# Patient Record
Sex: MALE | Race: WHITE | NOT HISPANIC OR LATINO | Employment: FULL TIME | URBAN - METROPOLITAN AREA
[De-identification: names, ages, dates, MRNs, and addresses within clinical notes are randomized per-mention and may not be internally consistent; named-entity substitution may affect disease eponyms.]

---

## 2018-03-19 DIAGNOSIS — Z00.00 ROUTINE GENERAL MEDICAL EXAMINATION AT A HEALTH CARE FACILITY: Primary | ICD-10-CM

## 2018-06-08 ENCOUNTER — TELEPHONE (OUTPATIENT)
Dept: INTERNAL MEDICINE | Facility: CLINIC | Age: 38
End: 2018-06-08

## 2018-06-08 ENCOUNTER — HOSPITAL ENCOUNTER (OUTPATIENT)
Dept: CARDIOLOGY | Facility: CLINIC | Age: 38
Discharge: HOME | End: 2018-06-08
Payer: COMMERCIAL

## 2018-06-08 ENCOUNTER — OFFICE VISIT (OUTPATIENT)
Dept: INTERNAL MEDICINE | Facility: CLINIC | Age: 38
End: 2018-06-08
Payer: COMMERCIAL

## 2018-06-08 ENCOUNTER — CLINICAL SUPPORT (OUTPATIENT)
Dept: INTERNAL MEDICINE | Facility: CLINIC | Age: 38
End: 2018-06-08
Payer: COMMERCIAL

## 2018-06-08 ENCOUNTER — HOSPITAL ENCOUNTER (OUTPATIENT)
Dept: RADIOLOGY | Facility: HOSPITAL | Age: 38
Discharge: HOME | End: 2018-06-08
Attending: INTERNAL MEDICINE
Payer: COMMERCIAL

## 2018-06-08 VITALS
HEART RATE: 70 BPM | SYSTOLIC BLOOD PRESSURE: 120 MMHG | HEIGHT: 71 IN | BODY MASS INDEX: 27.16 KG/M2 | OXYGEN SATURATION: 97 % | WEIGHT: 194 LBS | DIASTOLIC BLOOD PRESSURE: 82 MMHG

## 2018-06-08 DIAGNOSIS — Z00.00 ROUTINE GENERAL MEDICAL EXAMINATION AT A HEALTH CARE FACILITY: Primary | ICD-10-CM

## 2018-06-08 DIAGNOSIS — Z00.00 HEALTH CARE MAINTENANCE: Primary | ICD-10-CM

## 2018-06-08 DIAGNOSIS — D69.6 THROMBOCYTOPENIA: ICD-10-CM

## 2018-06-08 DIAGNOSIS — Z00.00 ROUTINE GENERAL MEDICAL EXAMINATION AT A HEALTH CARE FACILITY: ICD-10-CM

## 2018-06-08 DIAGNOSIS — R17 ELEVATED BILIRUBIN: ICD-10-CM

## 2018-06-08 LAB
ALBUMIN SERPL BCP-MCNC: 4.1 G/DL
ALP SERPL-CCNC: 78 U/L
ALT SERPL W/O P-5'-P-CCNC: 23 U/L
ANION GAP SERPL CALC-SCNC: 6 MMOL/L
AST SERPL-CCNC: 21 U/L
BILIRUB SERPL-MCNC: 1.9 MG/DL
BUN SERPL-MCNC: 15 MG/DL
CALCIUM SERPL-MCNC: 9.7 MG/DL
CHLORIDE SERPL-SCNC: 106 MMOL/L
CHOLEST SERPL-MCNC: 170 MG/DL
CHOLEST/HDLC SERPL: 3.2 {RATIO}
CO2 SERPL-SCNC: 27 MMOL/L
CREAT SERPL-MCNC: 1.2 MG/DL
ERYTHROCYTE [DISTWIDTH] IN BLOOD BY AUTOMATED COUNT: 12.7 %
EST. GFR  (AFRICAN AMERICAN): >60 ML/MIN/1.73 M^2
EST. GFR  (NON AFRICAN AMERICAN): >60 ML/MIN/1.73 M^2
ESTIMATED AVG GLUCOSE: 88 MG/DL
GLUCOSE SERPL-MCNC: 93 MG/DL
HBA1C MFR BLD HPLC: 4.7 %
HCT VFR BLD AUTO: 45.4 %
HDLC SERPL-MCNC: 53 MG/DL
HDLC SERPL: 31.2 %
HGB BLD-MCNC: 16 G/DL
HIV 1+2 AB+HIV1 P24 AG SERPL QL IA: NEGATIVE
LDLC SERPL CALC-MCNC: 104 MG/DL
MCH RBC QN AUTO: 30.8 PG
MCHC RBC AUTO-ENTMCNC: 35.2 G/DL
MCV RBC AUTO: 88 FL
NONHDLC SERPL-MCNC: 117 MG/DL
PLATELET # BLD AUTO: 138 K/UL
PMV BLD AUTO: 10.3 FL
POTASSIUM SERPL-SCNC: 4.5 MMOL/L
PROT SERPL-MCNC: 7.3 G/DL
RBC # BLD AUTO: 5.19 M/UL
SODIUM SERPL-SCNC: 139 MMOL/L
TRIGL SERPL-MCNC: 65 MG/DL
WBC # BLD AUTO: 5.86 K/UL

## 2018-06-08 PROCEDURE — 83036 HEMOGLOBIN GLYCOSYLATED A1C: CPT

## 2018-06-08 PROCEDURE — 86703 HIV-1/HIV-2 1 RESULT ANTBDY: CPT

## 2018-06-08 PROCEDURE — 99385 PREV VISIT NEW AGE 18-39: CPT | Mod: S$GLB,,, | Performed by: INTERNAL MEDICINE

## 2018-06-08 PROCEDURE — 97750 PHYSICAL PERFORMANCE TEST: CPT | Mod: S$GLB,,, | Performed by: INTERNAL MEDICINE

## 2018-06-08 PROCEDURE — 99999 PR PBB SHADOW E&M-EST. PATIENT-LVL III: CPT | Mod: PBBFAC,,, | Performed by: INTERNAL MEDICINE

## 2018-06-08 PROCEDURE — 93000 ELECTROCARDIOGRAM COMPLETE: CPT | Mod: S$GLB,,, | Performed by: INTERNAL MEDICINE

## 2018-06-08 PROCEDURE — 97802 MEDICAL NUTRITION INDIV IN: CPT | Mod: S$GLB,,, | Performed by: INTERNAL MEDICINE

## 2018-06-08 PROCEDURE — 71046 X-RAY EXAM CHEST 2 VIEWS: CPT | Mod: TC,FY

## 2018-06-08 PROCEDURE — 71046 X-RAY EXAM CHEST 2 VIEWS: CPT | Mod: 26,,, | Performed by: RADIOLOGY

## 2018-06-08 PROCEDURE — 80053 COMPREHEN METABOLIC PANEL: CPT

## 2018-06-08 PROCEDURE — 85027 COMPLETE CBC AUTOMATED: CPT

## 2018-06-08 PROCEDURE — 80061 LIPID PANEL: CPT

## 2018-06-08 PROCEDURE — 36415 COLL VENOUS BLD VENIPUNCTURE: CPT

## 2018-06-08 NOTE — PROGRESS NOTES
"Nutrition Assessment  Client name:  Matty Engel  :  1980  Age:  37 y.o.  Gender:  male    Client states:  Very pleasant [employer] employee here for his initial Executive Health physical.  Prefers to be called by his middle name, "Kristyn."  Has a somewhat unremarkable PMH with the exception of lung sx (in high school) and general "aches and pains" associated with aging.  Does not take any prescription medications or supplements daily.  Maintains a fairly active lifestyle, playing tennis 1x/week in addition to jogging 2-3x/week.  Admits, however, that when he suffers generalized pain from exercising, he stops exercising for weeks at a time and notices his eating habits worsen as a result.  Adds that he is a "stress eater," indulging in [employer] ice cream, cookies, or candy when stressed.  Typically, limits intake to 1x/week in pint-size form yet admits that he currently has a half gallon at home.  Realizes the importance of portion control and self-awareness.  Has made improvements in his dietary behaviors over the past ten years, noting that he has increased his water intake and reduced his intake of soft drinks (Dr. Pepper specifically) and candy.  However, admits that he and his wife dine out frequently, noting that they prepare meals at home 2x/week, dine out 1-2x/week, and order Uber[employer]ats 2x/week.  His wife follows a vegetarian meal plan and so, limits intake of meat.  Will eat meat, however, when dining out.  Adds that he considers his fruit and vegetable intake to be somewhat inadequate as he does not like all vegetables and does not make a conscious effort to eat fruit daily.  Has not had a "true" physical in several years as he has only completed onsite screenings at work when available.  Recalls slightly elevated BP at most recent screening.  Overall, considers his eating habits to be "mediocre," stating that they are not the worst but also not the best either.  Has never paid attention to nutrition " "labels specifically, inquiring about caloric and macronutrient targets of concern.  Overall, expressed interest in nutrition education and readiness to adopt improved dietary behaviors.    No past medical history on file.    Social History    Marital status:    Employment:  [employer]  Social History   Substance Use Topics    Smoking status: Never Smoker    Smokeless tobacco: Not on file    Alcohol use Yes      Comment: socially        Current medications:  currently has no medications in their medication list.  Vitamins, minerals, and/or supplements:  None   Food allergies or intolerances:  NKFA     Food History  Breakfast:  PJ's granita + croissant  Mid-morning Snack:  None  Lunch:  (typically eaten out) Plantain bowl/chicken + rice/gumbo + water  Mid-afternoon Snack:  None  Dinner:  Vegetable spaghetti/tacos/chicken enchiladas  H.S. Snack:  +/- ice cream (1x/week)    Exercise History:  Tennis 1x/week + jogging + lower back exercises 2-3x/week    Lab Reports   Total Cholesterol:  170    Triglycerides:  65  HDL:  53  LDL:  104   Glucose:  93  HbA1c:  4.7%  BP:  120/82  Bilirubin:  1.9     Weight History  Height:  5' 11"     Weight:  193#  BMI:  26.9  % Body Fat:  18.65%    Diagnosis  RMR (Method:  Body Welling):  2280 kcal  Activity Factor:  1.4  ADIEL:  3192 - 250 = 2942 kcal    Food and nutrition-related knowledge deficit related to inadequate nutrition education as evidenced by patient verbalizes inadequate nutrition education re:  general health.    Intervention    Goals:  1.  Maintain healthy body weight  2.  Maintain > 150 minutes physical activity/week as tolerated  3.  Increase meals prepared at home to 4x/week, reducing dining out frequency  4.  Consider meal delivery services (examples provided)  5.  Incorporate 1 fruit daily with breakfast and/or snack  6.  Incorporate a half plate of non-starchy vegetables with lunch and dinner daily, particularly when dining out  7.  Complete annual " physicals    To note, labs were not ready at time of consult and so, not discussed with patient.  Will defer elevated bilirubin to MD.  Nontheless, praised patient for active lifestyle and health conscious behaviors.  Supported patient's desire for meal delivery services in an attempt to reduce dining out frequency.  Reviewed local meal delivery companies and emailed link to recommended companies as reference.  Discussed the potential health consequences of frequent dining out as well as the benefits of increased fruit and vegetable intake.  Advised patient to focus on incorporating 1 fruit daily in addition to a half plate of non-starchy vegetables with lunch and dinner, particularly when dining out, for added fiber, satiety, and reduction in overeating.  Also, answered patient's questions regarding BP, discussing factors affecting it and ways to improve via reduced sodium intake, adequate potassium intake, and weight management.  Answered additional questions regarding label reading, caloric value, and macronutrient targets.  Stressed the importance of completing physicals annually for general health maintenance and preventative purposes.  Patient expressed overall satisfaction and gratitude of EH program and nutrition education.    Handouts provided:  Meal Planning Guide  Restaurant Guide  Eat Fit Shopping List  Eat Fit [place]  Fast Food Guide  Vitamin/Mineral Guide    Monitoring/Evaluation    Monitor the following:  Weight  BMI  Caloric intake  Bilirubin    Follow Up Plan:  Follow up with client in 1-2 years

## 2018-06-08 NOTE — PROGRESS NOTES
Subjective:       Patient ID: Matty Engel is a 37 y.o. male.    Chief Complaint: No chief complaint on file.    HPI   Patient has reported no previous history of cardiovascular or pulmonary disease.     Physical Limitations:   - Lower back hurts at times for the past 5 years.     - Fine now    Current Exercise Routine:   - Tennis 2 days per week 1.5 hours singles and doubles   - Jog 30 minutes 2 days per week    - Lower back exercises and push ups before jogs    Patient friendly and familiar with exercise.     Review of Systems    Objective:      The fitness evaluation results are as follows:    D.O.S. 6/8/2018   Height (in): 71   Weight (lbs): 193   BMI: 26.2186886   Body Fat (%): 18.65   Waist (cm): 87   Hip (cm): 105   WHR: 0.83   RBP (mmHg): 120/80   RHR (bpm): 66    Strength R (lbs)t: 116.061130    Strength Lt (lbs): 122.934955   Push-up Assessment: 40   Curl-up Assessment: 75   Flexibility Testing (cm): 46   REE (kcals): 2280     Physical Exam    Assessment:      Age/Gender Stratified Assessment:    Resting BP: Within Testing Limits   Body Fat %: Very Good   WHR Risk Factor: Low Risk    Strength R: Average    Strength L: Average   Upper Body Endurance: Excellent   Abdominal Endurance: Well Above Average   Lower body Flexibility: Excellent      1. Routine general medical examination at a health care facility        Plan:    Patient should continue with regular exercise routine following below guidelines.     Recommended Fitness Guidelines:   - 150 minutes of moderate intensity aerobic exercise each week OR 75 minutes of vigorous    - 2-4 days per week of resistance training for each muscle group   - Daily stretching    Back Stabilization protocol given

## 2018-06-08 NOTE — PROGRESS NOTES
"Subjective:       Patient ID: Matty Engel is a 37 y.o. male.    Chief Complaint: Executive Health    HPI   Matty Engel is a 37 y.o. male here for an annual executive health physical.     a1c 4.7%  plt 138  Bili 1.9  10 year lipid risk 0.7%  CXR nl  EKG awaiting read - NSR    He has hx of recurring low back pain. Doing well last 6 months. MRI last year with herniated disc. Has done chiropractor and PT.     MGF had colon cancer. He has had colonoscopies - last 2 years ago.    Review of Systems   Constitutional: Negative for fever.   HENT: Negative.    Eyes: Negative.    Respiratory: Negative for shortness of breath.    Cardiovascular: Negative for chest pain and leg swelling.   Gastrointestinal: Negative for abdominal pain, diarrhea, nausea and vomiting.   Genitourinary: Negative.    Musculoskeletal: Negative for arthralgias.   Skin: Negative for rash.   Psychiatric/Behavioral: Negative.        Objective:   /82 (BP Location: Right arm, Patient Position: Sitting, BP Method: Large (Manual))   Pulse 70   Ht 5' 11" (1.803 m)   Wt 88 kg (194 lb)   SpO2 97%   BMI 27.06 kg/m²      Physical Exam   Constitutional: He is oriented to person, place, and time. He appears well-developed and well-nourished. No distress.   HENT:   Head: Normocephalic and atraumatic.   Cardiovascular: Normal rate and regular rhythm.    No murmur heard.  Pulmonary/Chest: Effort normal. No respiratory distress. He has no wheezes. He has no rales.   Neurological: He is alert and oriented to person, place, and time.   Skin: Skin is warm and dry. He is not diaphoretic.   Psychiatric: He has a normal mood and affect. His behavior is normal.       Assessment:       1. Health care maintenance    2. Elevated bilirubin    3. Thrombocytopenia        Plan:       Matty was seen today for [employer]ix.    Diagnoses and all orders for this visit:    Health care maintenance  tdap next month    Elevated bilirubin  -     Hepatic function panel; " Future in 4 weeks    Thrombocytopenia  -     CBC auto differential; Future in 4 weeks

## 2018-06-08 NOTE — TELEPHONE ENCOUNTER
Spoke with pt. Pt informed that HIV screening was negative. Advised pt to please contact us at our office number if he has any problems that cause for concern. Pt verbalized understanding.

## 2020-05-13 ENCOUNTER — LAB VISIT (OUTPATIENT)
Dept: PRIMARY CARE CLINIC | Facility: CLINIC | Age: 40
End: 2020-05-13
Payer: COMMERCIAL

## 2020-05-13 DIAGNOSIS — Z00.6 RESEARCH STUDY PATIENT: Primary | ICD-10-CM

## 2020-05-13 LAB — SARS-COV-2 IGG SERPLBLD QL IA.RAPID: NEGATIVE

## 2020-05-13 PROCEDURE — 86769 SARS-COV-2 COVID-19 ANTIBODY: CPT

## 2020-05-13 PROCEDURE — U0002 COVID-19 LAB TEST NON-CDC: HCPCS

## 2020-05-14 LAB — SARS-COV-2 RNA RESP QL NAA+PROBE: NOT DETECTED

## 2020-10-05 ENCOUNTER — PATIENT MESSAGE (OUTPATIENT)
Dept: ADMINISTRATIVE | Facility: HOSPITAL | Age: 40
End: 2020-10-05

## 2020-12-24 ENCOUNTER — CLINICAL SUPPORT (OUTPATIENT)
Dept: URGENT CARE | Facility: CLINIC | Age: 40
End: 2020-12-24
Payer: COMMERCIAL

## 2020-12-24 DIAGNOSIS — Z11.9 SCREENING EXAMINATION FOR UNSPECIFIED INFECTIOUS DISEASE: Primary | ICD-10-CM

## 2020-12-24 LAB
CTP QC/QA: YES
SARS-COV-2 RDRP RESP QL NAA+PROBE: NEGATIVE

## 2020-12-24 PROCEDURE — 99211 OFF/OP EST MAY X REQ PHY/QHP: CPT | Mod: S$GLB,CS,, | Performed by: FAMILY MEDICINE

## 2020-12-24 PROCEDURE — 99211 PR OFFICE/OUTPT VISIT, EST, LEVL I: ICD-10-PCS | Mod: S$GLB,CS,, | Performed by: FAMILY MEDICINE

## 2020-12-24 PROCEDURE — U0002: ICD-10-PCS | Mod: QW,S$GLB,, | Performed by: FAMILY MEDICINE

## 2020-12-24 PROCEDURE — U0002 COVID-19 LAB TEST NON-CDC: HCPCS | Mod: QW,S$GLB,, | Performed by: FAMILY MEDICINE

## 2021-01-04 ENCOUNTER — PATIENT MESSAGE (OUTPATIENT)
Dept: ADMINISTRATIVE | Facility: HOSPITAL | Age: 41
End: 2021-01-04

## 2021-04-05 ENCOUNTER — PATIENT MESSAGE (OUTPATIENT)
Dept: ADMINISTRATIVE | Facility: HOSPITAL | Age: 41
End: 2021-04-05

## 2021-04-16 ENCOUNTER — PATIENT MESSAGE (OUTPATIENT)
Dept: RESEARCH | Facility: HOSPITAL | Age: 41
End: 2021-04-16

## 2021-07-17 ENCOUNTER — OFFICE VISIT (OUTPATIENT)
Dept: URGENT CARE | Facility: CLINIC | Age: 41
End: 2021-07-17
Payer: COMMERCIAL

## 2021-07-17 VITALS
BODY MASS INDEX: 26.6 KG/M2 | OXYGEN SATURATION: 96 % | SYSTOLIC BLOOD PRESSURE: 135 MMHG | TEMPERATURE: 98 F | DIASTOLIC BLOOD PRESSURE: 82 MMHG | RESPIRATION RATE: 20 BRPM | HEART RATE: 78 BPM | WEIGHT: 190 LBS | HEIGHT: 71 IN

## 2021-07-17 DIAGNOSIS — J34.9 SINUS PROBLEM: ICD-10-CM

## 2021-07-17 DIAGNOSIS — J06.9 VIRAL URI: Primary | ICD-10-CM

## 2021-07-17 LAB
CTP QC/QA: YES
SARS-COV-2 RDRP RESP QL NAA+PROBE: NEGATIVE

## 2021-07-17 PROCEDURE — 99212 PR OFFICE/OUTPT VISIT, EST, LEVL II, 10-19 MIN: ICD-10-PCS | Mod: S$GLB,,, | Performed by: FAMILY MEDICINE

## 2021-07-17 PROCEDURE — 3008F BODY MASS INDEX DOCD: CPT | Mod: CPTII,S$GLB,, | Performed by: FAMILY MEDICINE

## 2021-07-17 PROCEDURE — U0002: ICD-10-PCS | Mod: QW,S$GLB,, | Performed by: FAMILY MEDICINE

## 2021-07-17 PROCEDURE — U0002 COVID-19 LAB TEST NON-CDC: HCPCS | Mod: QW,S$GLB,, | Performed by: FAMILY MEDICINE

## 2021-07-17 PROCEDURE — 3008F PR BODY MASS INDEX (BMI) DOCUMENTED: ICD-10-PCS | Mod: CPTII,S$GLB,, | Performed by: FAMILY MEDICINE

## 2021-07-17 PROCEDURE — 99212 OFFICE O/P EST SF 10 MIN: CPT | Mod: S$GLB,,, | Performed by: FAMILY MEDICINE

## 2021-08-14 ENCOUNTER — HISTORICAL (OUTPATIENT)
Dept: ADMINISTRATIVE | Facility: HOSPITAL | Age: 41
End: 2021-08-14

## 2021-08-14 LAB — SARS-COV-2 RNA RESP QL NAA+PROBE: NEGATIVE

## 2022-04-07 ENCOUNTER — OFFICE VISIT (OUTPATIENT)
Dept: URGENT CARE | Facility: CLINIC | Age: 42
End: 2022-04-07
Payer: COMMERCIAL

## 2022-04-07 VITALS
HEART RATE: 68 BPM | OXYGEN SATURATION: 97 % | RESPIRATION RATE: 18 BRPM | SYSTOLIC BLOOD PRESSURE: 126 MMHG | WEIGHT: 195 LBS | DIASTOLIC BLOOD PRESSURE: 81 MMHG | BODY MASS INDEX: 27.3 KG/M2 | HEIGHT: 71 IN | TEMPERATURE: 97 F

## 2022-04-07 DIAGNOSIS — L03.032 PARONYCHIA OF GREAT TOE, LEFT: Primary | ICD-10-CM

## 2022-04-07 PROCEDURE — 1159F MED LIST DOCD IN RCRD: CPT | Mod: CPTII,S$GLB,, | Performed by: NURSE PRACTITIONER

## 2022-04-07 PROCEDURE — 10060 I&D ABSCESS SIMPLE/SINGLE: CPT | Mod: S$GLB,,, | Performed by: NURSE PRACTITIONER

## 2022-04-07 PROCEDURE — 3008F PR BODY MASS INDEX (BMI) DOCUMENTED: ICD-10-PCS | Mod: CPTII,S$GLB,, | Performed by: NURSE PRACTITIONER

## 2022-04-07 PROCEDURE — 3079F PR MOST RECENT DIASTOLIC BLOOD PRESSURE 80-89 MM HG: ICD-10-PCS | Mod: CPTII,S$GLB,, | Performed by: NURSE PRACTITIONER

## 2022-04-07 PROCEDURE — 10060 INCISION & DRAINAGE: ICD-10-PCS | Mod: S$GLB,,, | Performed by: NURSE PRACTITIONER

## 2022-04-07 PROCEDURE — 3074F PR MOST RECENT SYSTOLIC BLOOD PRESSURE < 130 MM HG: ICD-10-PCS | Mod: CPTII,S$GLB,, | Performed by: NURSE PRACTITIONER

## 2022-04-07 PROCEDURE — 3079F DIAST BP 80-89 MM HG: CPT | Mod: CPTII,S$GLB,, | Performed by: NURSE PRACTITIONER

## 2022-04-07 PROCEDURE — 1159F PR MEDICATION LIST DOCUMENTED IN MEDICAL RECORD: ICD-10-PCS | Mod: CPTII,S$GLB,, | Performed by: NURSE PRACTITIONER

## 2022-04-07 PROCEDURE — 1160F PR REVIEW ALL MEDS BY PRESCRIBER/CLIN PHARMACIST DOCUMENTED: ICD-10-PCS | Mod: CPTII,S$GLB,, | Performed by: NURSE PRACTITIONER

## 2022-04-07 PROCEDURE — 99204 OFFICE O/P NEW MOD 45 MIN: CPT | Mod: 25,S$GLB,, | Performed by: NURSE PRACTITIONER

## 2022-04-07 PROCEDURE — 99204 PR OFFICE/OUTPT VISIT, NEW, LEVL IV, 45-59 MIN: ICD-10-PCS | Mod: 25,S$GLB,, | Performed by: NURSE PRACTITIONER

## 2022-04-07 PROCEDURE — 1160F RVW MEDS BY RX/DR IN RCRD: CPT | Mod: CPTII,S$GLB,, | Performed by: NURSE PRACTITIONER

## 2022-04-07 PROCEDURE — 3074F SYST BP LT 130 MM HG: CPT | Mod: CPTII,S$GLB,, | Performed by: NURSE PRACTITIONER

## 2022-04-07 PROCEDURE — 3008F BODY MASS INDEX DOCD: CPT | Mod: CPTII,S$GLB,, | Performed by: NURSE PRACTITIONER

## 2022-04-07 NOTE — PROCEDURES
"Incision & Drainage    Date/Time: 4/7/2022 11:15 AM  Performed by: Gricelda Pierce NP  Authorized by: Gricelda Pierce NP     Time out: Immediately prior to procedure a "time out" was called to verify the correct patient, procedure, equipment, support staff and site/side marked as required.    Consent Done?:  Yes (Verbal)    Type:  Abscess  Body area:  Lower extremity  Location details:  Left big toe  Anesthesia:  Local infiltration  Local anesthetic: lidocaine 1% without epinephrine  Anesthetic total (ml):  2  Scalpel size:  10  Incision type:  Single straight  Incision depth: dermal    Complexity:  Simple  Drainage:  Pus and bloody  Drainage amount:  Scant  Wound treatment:  Incision and drainage  Patient tolerance:  Patient tolerated the procedure well with no immediate complications    Topical bacitracin applied along with gauze dressing       "

## 2022-04-07 NOTE — PROGRESS NOTES
"Subjective:       Patient ID: Matty Engel is a 41 y.o. male.    Vitals:  height is 5' 11" (1.803 m) and weight is 88.5 kg (195 lb). His temporal temperature is 97.4 °F (36.3 °C). His blood pressure is 126/81 and his pulse is 68. His respiration is 18 and oxygen saturation is 97%.     Chief Complaint: Nail Problem    Patient c/o left great toe pain proximal to the nail bed, + redness and swelling.       Nail Problem  The current episode started in the past 7 days. The problem occurs constantly. The problem has been unchanged. Pertinent negatives include no abdominal pain, anorexia, arthralgias, change in bowel habit, chest pain, chills, congestion, coughing, diaphoresis, fatigue, fever, headaches, joint swelling, myalgias, nausea, neck pain, numbness, rash, sore throat, swollen glands, urinary symptoms, vertigo, visual change, vomiting or weakness. Nothing aggravates the symptoms. He has tried nothing for the symptoms.       Constitution: Negative for chills, sweating, fatigue and fever.   HENT: Negative for congestion and sore throat.    Neck: Negative for neck pain.   Cardiovascular: Negative for chest pain.   Respiratory: Negative for cough.    Gastrointestinal: Negative for abdominal pain, nausea and vomiting.   Musculoskeletal: Negative for joint pain, joint swelling and muscle ache.   Skin: Positive for erythema and abscess. Negative for rash.   Neurological: Negative for history of vertigo, headaches and numbness.       Objective:      Physical Exam   Constitutional:  Non-toxic appearance. He does not appear ill. No distress.   HENT:   Head: Normocephalic and atraumatic.   Cardiovascular: Normal rate.   Pulmonary/Chest: Effort normal. No respiratory distress.   Abdominal: Normal appearance.   Musculoskeletal: Normal range of motion.         General: Tenderness present. No deformity or signs of injury. Normal range of motion.      Right lower leg: No edema.      Left lower leg: No edema. "   Neurological: He is alert.   Skin: Skin is warm and not diaphoretic. erythema         Comments: L great toe with nail bed redness, swelling with small underlying abscess.    Nursing note and vitals reviewed.        Assessment:       1. Paronychia of great toe, left          Plan:         Paronychia of great toe, left                 - follow wound care as directed  -See additional patient Instructions provided    Patient Instructions   - You must understand that you have received an Urgent Care treatment only and that you may be released before all of your medical problems are known or treated.   - You, the patient, will arrange for follow up care as instructed.   - If your condition worsens or fails to improve we recommend that you receive another evaluation at the ER immediately or contact your PCP to discuss your concerns or return here.

## 2022-04-10 ENCOUNTER — HISTORICAL (OUTPATIENT)
Dept: ADMINISTRATIVE | Facility: HOSPITAL | Age: 42
End: 2022-04-10
Payer: COMMERCIAL

## 2022-04-26 VITALS
OXYGEN SATURATION: 98 % | BODY MASS INDEX: 26.6 KG/M2 | SYSTOLIC BLOOD PRESSURE: 127 MMHG | DIASTOLIC BLOOD PRESSURE: 87 MMHG | HEIGHT: 71 IN | WEIGHT: 190 LBS

## 2022-12-12 NOTE — PROGRESS NOTES
Impression: Long term (current) use of oral antidiabetic drugs: Z79.84.  Plan: As above LABS DRAWN

## 2023-09-29 DIAGNOSIS — Z00.00 ROUTINE GENERAL MEDICAL EXAMINATION AT A HEALTH CARE FACILITY: Primary | ICD-10-CM

## 2023-10-27 ENCOUNTER — HOSPITAL ENCOUNTER (OUTPATIENT)
Dept: CARDIOLOGY | Facility: HOSPITAL | Age: 43
Discharge: HOME | End: 2023-10-27
Attending: STUDENT IN AN ORGANIZED HEALTH CARE EDUCATION/TRAINING PROGRAM
Payer: COMMERCIAL

## 2023-10-27 ENCOUNTER — HOSPITAL ENCOUNTER (OUTPATIENT)
Dept: RADIOLOGY | Facility: HOSPITAL | Age: 43
Discharge: HOME | End: 2023-10-27
Attending: STUDENT IN AN ORGANIZED HEALTH CARE EDUCATION/TRAINING PROGRAM
Payer: COMMERCIAL

## 2023-10-27 ENCOUNTER — OFFICE VISIT (OUTPATIENT)
Dept: INTERNAL MEDICINE | Facility: CLINIC | Age: 43
End: 2023-10-27
Payer: COMMERCIAL

## 2023-10-27 ENCOUNTER — CLINICAL SUPPORT (OUTPATIENT)
Dept: INTERNAL MEDICINE | Facility: CLINIC | Age: 43
End: 2023-10-27
Payer: COMMERCIAL

## 2023-10-27 VITALS — BODY MASS INDEX: 27.3 KG/M2 | HEIGHT: 71 IN | WEIGHT: 195 LBS

## 2023-10-27 DIAGNOSIS — Z00.00 ROUTINE GENERAL MEDICAL EXAMINATION AT A HEALTH CARE FACILITY: ICD-10-CM

## 2023-10-27 DIAGNOSIS — Z00.00 ROUTINE GENERAL MEDICAL EXAMINATION AT A HEALTH CARE FACILITY: Primary | ICD-10-CM

## 2023-10-27 DIAGNOSIS — Z00.00 HEALTHCARE MAINTENANCE: ICD-10-CM

## 2023-10-27 DIAGNOSIS — R17 ELEVATED BILIRUBIN: ICD-10-CM

## 2023-10-27 DIAGNOSIS — D69.6 THROMBOCYTOPENIA: ICD-10-CM

## 2023-10-27 DIAGNOSIS — D17.1 LIPOMA OF BACK: ICD-10-CM

## 2023-10-27 LAB
ALBUMIN SERPL BCP-MCNC: 4.3 G/DL (ref 3.5–5.2)
ALP SERPL-CCNC: 76 U/L (ref 55–135)
ALT SERPL W/O P-5'-P-CCNC: 30 U/L (ref 10–44)
ANION GAP SERPL CALC-SCNC: 9 MMOL/L (ref 8–16)
AST SERPL-CCNC: 30 U/L (ref 10–40)
BILIRUB SERPL-MCNC: 1.9 MG/DL (ref 0.1–1)
BUN SERPL-MCNC: 20 MG/DL (ref 6–20)
CALCIUM SERPL-MCNC: 9.8 MG/DL (ref 8.7–10.5)
CHLORIDE SERPL-SCNC: 106 MMOL/L (ref 95–110)
CHOLEST SERPL-MCNC: 187 MG/DL (ref 120–199)
CHOLEST/HDLC SERPL: 3.2 {RATIO} (ref 2–5)
CO2 SERPL-SCNC: 24 MMOL/L (ref 23–29)
COMPLEXED PSA SERPL-MCNC: 0.54 NG/ML (ref 0–4)
CREAT SERPL-MCNC: 1.3 MG/DL (ref 0.5–1.4)
CV STRESS BASE HR: 62 BPM
DIASTOLIC BLOOD PRESSURE: 80 MMHG
ERYTHROCYTE [DISTWIDTH] IN BLOOD BY AUTOMATED COUNT: 12.6 % (ref 11.5–14.5)
EST. GFR  (NO RACE VARIABLE): >60 ML/MIN/1.73 M^2
ESTIMATED AVG GLUCOSE: 85 MG/DL (ref 68–131)
GLUCOSE SERPL-MCNC: 96 MG/DL (ref 70–110)
HBA1C MFR BLD: 4.6 % (ref 4–5.6)
HCT VFR BLD AUTO: 46 % (ref 40–54)
HDLC SERPL-MCNC: 59 MG/DL (ref 40–75)
HDLC SERPL: 31.6 % (ref 20–50)
HGB BLD-MCNC: 16 G/DL (ref 14–18)
LDLC SERPL CALC-MCNC: 117.6 MG/DL (ref 63–159)
MCH RBC QN AUTO: 30.9 PG (ref 27–31)
MCHC RBC AUTO-ENTMCNC: 34.8 G/DL (ref 32–36)
MCV RBC AUTO: 89 FL (ref 82–98)
NONHDLC SERPL-MCNC: 128 MG/DL
OHS CV CPX 1 MINUTE RECOVERY HEART RATE: 151 BPM
OHS CV CPX 85 PERCENT MAX PREDICTED HEART RATE MALE: 152
OHS CV CPX ESTIMATED METS: 15
OHS CV CPX MAX PREDICTED HEART RATE: 179
OHS CV CPX PATIENT IS FEMALE: 0
OHS CV CPX PATIENT IS MALE: 1
OHS CV CPX PEAK DIASTOLIC BLOOD PRESSURE: 76 MMHG
OHS CV CPX PEAK HEAR RATE: 166 BPM
OHS CV CPX PEAK RATE PRESSURE PRODUCT: NORMAL
OHS CV CPX PEAK SYSTOLIC BLOOD PRESSURE: 218 MMHG
OHS CV CPX PERCENT MAX PREDICTED HEART RATE ACHIEVED: 93
OHS CV CPX RATE PRESSURE PRODUCT PRESENTING: 6944
PLATELET # BLD AUTO: 148 K/UL (ref 150–450)
PMV BLD AUTO: 10.5 FL (ref 9.2–12.9)
POTASSIUM SERPL-SCNC: 5 MMOL/L (ref 3.5–5.1)
PROT SERPL-MCNC: 7.4 G/DL (ref 6–8.4)
RBC # BLD AUTO: 5.17 M/UL (ref 4.6–6.2)
SODIUM SERPL-SCNC: 139 MMOL/L (ref 136–145)
STRESS ECHO POST EXERCISE DUR MIN: 9 MINUTES
STRESS ECHO POST EXERCISE DUR SEC: 5 SECONDS
SYSTOLIC BLOOD PRESSURE: 112 MMHG
TRIGL SERPL-MCNC: 52 MG/DL (ref 30–150)
TSH SERPL DL<=0.005 MIU/L-ACNC: 1.12 UIU/ML (ref 0.4–4)
WBC # BLD AUTO: 5.35 K/UL (ref 3.9–12.7)

## 2023-10-27 PROCEDURE — 99386 PREV VISIT NEW AGE 40-64: CPT | Mod: S$GLB,,, | Performed by: STUDENT IN AN ORGANIZED HEALTH CARE EDUCATION/TRAINING PROGRAM

## 2023-10-27 PROCEDURE — 3008F BODY MASS INDEX DOCD: CPT | Mod: CPTII,S$GLB,, | Performed by: STUDENT IN AN ORGANIZED HEALTH CARE EDUCATION/TRAINING PROGRAM

## 2023-10-27 PROCEDURE — 83036 HEMOGLOBIN GLYCOSYLATED A1C: CPT | Performed by: STUDENT IN AN ORGANIZED HEALTH CARE EDUCATION/TRAINING PROGRAM

## 2023-10-27 PROCEDURE — 71046 X-RAY EXAM CHEST 2 VIEWS: CPT | Mod: 26,,, | Performed by: RADIOLOGY

## 2023-10-27 PROCEDURE — 1159F MED LIST DOCD IN RCRD: CPT | Mod: CPTII,S$GLB,, | Performed by: STUDENT IN AN ORGANIZED HEALTH CARE EDUCATION/TRAINING PROGRAM

## 2023-10-27 PROCEDURE — 93016 CV STRESS TEST SUPVJ ONLY: CPT | Mod: ,,, | Performed by: INTERNAL MEDICINE

## 2023-10-27 PROCEDURE — 99386 PR PREVENTIVE VISIT,NEW,40-64: ICD-10-PCS | Mod: S$GLB,,, | Performed by: STUDENT IN AN ORGANIZED HEALTH CARE EDUCATION/TRAINING PROGRAM

## 2023-10-27 PROCEDURE — 1159F PR MEDICATION LIST DOCUMENTED IN MEDICAL RECORD: ICD-10-PCS | Mod: CPTII,S$GLB,, | Performed by: STUDENT IN AN ORGANIZED HEALTH CARE EDUCATION/TRAINING PROGRAM

## 2023-10-27 PROCEDURE — 80061 LIPID PANEL: CPT | Performed by: STUDENT IN AN ORGANIZED HEALTH CARE EDUCATION/TRAINING PROGRAM

## 2023-10-27 PROCEDURE — 99999 PR PBB SHADOW E&M-EST. PATIENT-LVL III: ICD-10-PCS | Mod: PBBFAC,,, | Performed by: STUDENT IN AN ORGANIZED HEALTH CARE EDUCATION/TRAINING PROGRAM

## 2023-10-27 PROCEDURE — 3008F PR BODY MASS INDEX (BMI) DOCUMENTED: ICD-10-PCS | Mod: CPTII,S$GLB,, | Performed by: STUDENT IN AN ORGANIZED HEALTH CARE EDUCATION/TRAINING PROGRAM

## 2023-10-27 PROCEDURE — 3074F PR MOST RECENT SYSTOLIC BLOOD PRESSURE < 130 MM HG: ICD-10-PCS | Mod: CPTII,S$GLB,, | Performed by: STUDENT IN AN ORGANIZED HEALTH CARE EDUCATION/TRAINING PROGRAM

## 2023-10-27 PROCEDURE — 71046 X-RAY EXAM CHEST 2 VIEWS: CPT | Mod: TC,FY

## 2023-10-27 PROCEDURE — 3079F DIAST BP 80-89 MM HG: CPT | Mod: CPTII,S$GLB,, | Performed by: STUDENT IN AN ORGANIZED HEALTH CARE EDUCATION/TRAINING PROGRAM

## 2023-10-27 PROCEDURE — 93016 EXERCISE STRESS - EKG (CUPID ONLY): ICD-10-PCS | Mod: ,,, | Performed by: INTERNAL MEDICINE

## 2023-10-27 PROCEDURE — 99999 PR PBB SHADOW E&M-EST. PATIENT-LVL I: CPT | Mod: PBBFAC,,,

## 2023-10-27 PROCEDURE — 3074F SYST BP LT 130 MM HG: CPT | Mod: CPTII,S$GLB,, | Performed by: STUDENT IN AN ORGANIZED HEALTH CARE EDUCATION/TRAINING PROGRAM

## 2023-10-27 PROCEDURE — 84443 ASSAY THYROID STIM HORMONE: CPT | Performed by: STUDENT IN AN ORGANIZED HEALTH CARE EDUCATION/TRAINING PROGRAM

## 2023-10-27 PROCEDURE — 1160F RVW MEDS BY RX/DR IN RCRD: CPT | Mod: CPTII,S$GLB,, | Performed by: STUDENT IN AN ORGANIZED HEALTH CARE EDUCATION/TRAINING PROGRAM

## 2023-10-27 PROCEDURE — 80053 COMPREHEN METABOLIC PANEL: CPT | Performed by: STUDENT IN AN ORGANIZED HEALTH CARE EDUCATION/TRAINING PROGRAM

## 2023-10-27 PROCEDURE — 93018 CV STRESS TEST I&R ONLY: CPT | Mod: ,,, | Performed by: INTERNAL MEDICINE

## 2023-10-27 PROCEDURE — 84153 ASSAY OF PSA TOTAL: CPT | Performed by: STUDENT IN AN ORGANIZED HEALTH CARE EDUCATION/TRAINING PROGRAM

## 2023-10-27 PROCEDURE — 3079F PR MOST RECENT DIASTOLIC BLOOD PRESSURE 80-89 MM HG: ICD-10-PCS | Mod: CPTII,S$GLB,, | Performed by: STUDENT IN AN ORGANIZED HEALTH CARE EDUCATION/TRAINING PROGRAM

## 2023-10-27 PROCEDURE — 71046 XR CHEST PA AND LATERAL: ICD-10-PCS | Mod: 26,,, | Performed by: RADIOLOGY

## 2023-10-27 PROCEDURE — 3044F PR MOST RECENT HEMOGLOBIN A1C LEVEL <7.0%: ICD-10-PCS | Mod: CPTII,S$GLB,, | Performed by: STUDENT IN AN ORGANIZED HEALTH CARE EDUCATION/TRAINING PROGRAM

## 2023-10-27 PROCEDURE — 85027 COMPLETE CBC AUTOMATED: CPT | Performed by: STUDENT IN AN ORGANIZED HEALTH CARE EDUCATION/TRAINING PROGRAM

## 2023-10-27 PROCEDURE — 93017 CV STRESS TEST TRACING ONLY: CPT

## 2023-10-27 PROCEDURE — 93018 EXERCISE STRESS - EKG (CUPID ONLY): ICD-10-PCS | Mod: ,,, | Performed by: INTERNAL MEDICINE

## 2023-10-27 PROCEDURE — 1160F PR REVIEW ALL MEDS BY PRESCRIBER/CLIN PHARMACIST DOCUMENTED: ICD-10-PCS | Mod: CPTII,S$GLB,, | Performed by: STUDENT IN AN ORGANIZED HEALTH CARE EDUCATION/TRAINING PROGRAM

## 2023-10-27 PROCEDURE — 99999 PR PBB SHADOW E&M-EST. PATIENT-LVL I: ICD-10-PCS | Mod: PBBFAC,,,

## 2023-10-27 PROCEDURE — 99999 PR PBB SHADOW E&M-EST. PATIENT-LVL III: CPT | Mod: PBBFAC,,, | Performed by: STUDENT IN AN ORGANIZED HEALTH CARE EDUCATION/TRAINING PROGRAM

## 2023-10-27 PROCEDURE — 3044F HG A1C LEVEL LT 7.0%: CPT | Mod: CPTII,S$GLB,, | Performed by: STUDENT IN AN ORGANIZED HEALTH CARE EDUCATION/TRAINING PROGRAM

## 2023-10-27 NOTE — ASSESSMENT & PLAN NOTE
Patient is in overall good general health.  Stable medical conditions listed on the PMH.  Labs reviewed and patient notified.

## 2023-10-27 NOTE — ASSESSMENT & PLAN NOTE
Health care maintenance and core gaps reviewed and assessed with patient.  Vaccinations recommended:        Tetanus - O       Shingles - N/A       Influenza - O       Pneumonia - N/A  Colon cancer screening:   Colonoscopy: N/A  Lifestyle recommendations given.  Physical activity recommended.    Legend: Ordered (O), Declined (D), Current (C)

## 2023-10-27 NOTE — ASSESSMENT & PLAN NOTE
2 cm x 2 cm lipoma of the upper back, right para spinal area at the base of the neck, non-tender, mobile.   Will observe for now.

## 2023-10-27 NOTE — PROGRESS NOTES
Subjective:       Patient ID: Matty Engel is a 42 y.o. male.    Chief Complaint: Executive Health ([place]ton size protrusion off spine/ upper back)    HPI    Matty Engel is a 42 y.o. male , English speaking, without significant PMH.      [Local Patient]  Originally from [facility]  Lives in: [place] [phone]       Patient comes to the clinic a general medical health examination through [facility] and to establish care.    Patient is currently asymptomatic and has no complaints.  His only concern is having a small mass of his upper back, non-tender, mobile.     Patient denies CV symptoms, CP, SOB, palpitations.  Patient denies constitutional symptoms, fever, changes in the urine or stool.    PMH:  Past Medical History:   Diagnosis Date    H/O: pneumonia 1997    Hospitalized, chest tube       PSH:  Past Surgical History:   Procedure Laterality Date    ANKLE SURGERY Left 01/22/2018    bone spur removal    TUBE THORACOTOMY  1997    for pneumonia       FH:  Family History   Problem Relation Age of Onset    No Known Problems Mother     No Known Problems Father     Colon cancer Maternal Grandfather 50       Allergies: Negative  Review of patient's allergies indicates:  No Known Allergies    Meds:  No current outpatient medications on file.    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    Diet: Regular with no restrictions    Tobacco: Denies  Tobacco Use: Low Risk  (10/27/2023)    Patient History     Smoking Tobacco Use: Never     Smokeless Tobacco Use: Never     Passive Exposure: Not on file        Alcohol: Socially on the weekends.    Recreational drug use: Denies    Recent travel: Denies      Most recent laboratories reviewed:    Recent Labs   Lab 10/27/23  0810   WBC 5.35   Hemoglobin 16.0   Hematocrit 46.0   MCV 89   Platelets 148 L       Recent Labs   Lab 10/27/23  0810   Glucose 96   Sodium  "139   Potassium 5.0   BUN 20   Creatinine 1.3   Total Bilirubin 1.9 H   AST 30   ALT 30       Recent Labs   Lab 10/27/23  0810   Hemoglobin A1C 4.6       Recent Labs   Lab 10/27/23  0810   Cholesterol 187   Triglycerides 52   HDL 59   LDL Cholesterol 117.6   Non-HDL Cholesterol 128       Recent Labs   Lab 10/27/23  0810   TSH 1.119   PSA, Screen 0.54               Exercise stress test:    The ECG portion of the study is negative for ischemia.    The patient reported no chest pain during the stress test.    There were no arrhythmias during stress.    The exercise capacity was above average.    There was a hypertensive blood pressure response with stress.    Healthcare Maintenance:  Colon cancer screening:   n/a  Vaccinations: Pneumonia, Zoster, Tetanus (no)  COVID vaccination: completed x  3  Depression screening: PHQ2 score = 0    Annual visit approx. Month: October ROS  11-point review of systems done. Negative except for detailed in the HPI.        Objective:      /89   Pulse 76   Ht 5' 11" (1.803 m)   Wt 55.5 kg (122 lb 5.7 oz)   BMI 17.07 kg/m²      Physical Exam   General appearance: Good general aspect, NAD, conversant   Eyes and HEENT: Normal sclerae, moist mucous membranes, no thyromegaly or lymphadenopathy  Respiratory: No work of breathing, clear to auscultation bilaterally. No rales, rhonchi, wheezing, or rubs.  Cardiovascular: PMI not displaced. RRR. Normal S1, S2. No murmurs, rubs or gallops.  Abdomen and : Soft, non-tender, nondistended, BS, no hepatosplenomegaly, no masses.  Extremities: no edemas, no extremity lymphadenopathy, no clubbing, no cyanosis.  Nervous System: Alert and oriented x 3, good concentration, no deficits.  Skin: Normal temperature, turgor and texture; no rash, ulcers or subcutaneous nodules  2 cm x 2 cm lipoma of the upper back, right para spinal area at the base of the neck, non-tender, mobile.   Psych: Appropriate affect, alert and oriented to person, place and " time          Assessment:       1. Routine general medical examination at a health care facility  Assessment & Plan:  Patient is in overall good general health.  Stable medical conditions listed on the PMH.  Labs reviewed and patient notified.    Orders:  -     CBC Auto Differential; Future; Expected date: 10/27/2024  -     Comprehensive Metabolic Panel; Future; Expected date: 10/27/2024  -     Hemoglobin A1C; Future; Expected date: 10/27/2024  -     Lipid Panel; Future; Expected date: 10/27/2024  -     Hepatitis C Antibody; Future; Expected date: 10/27/2024    2. Elevated bilirubin  Assessment & Plan:  Fox 1.9  Stable, at baseline.  Maybe Gilbert's. Will observe for now.      3. Thrombocytopenia  Assessment & Plan:  Mild  At baseline, possibly idiopathic  No signs of bleeding  Will observe for now      4. Healthcare maintenance  Assessment & Plan:  Health care maintenance and core gaps reviewed and assessed with patient.  Vaccinations recommended:        Tetanus - O       Shingles - N/A       Influenza - O       Pneumonia - N/A  Colon cancer screening:   Colonoscopy: N/A  Lifestyle recommendations given.  Physical activity recommended.    Legend: Ordered (O), Declined (D), Current (C)      Orders:  -     Tdap Vaccine; Future  -     Influenza - Quadrivalent (PF); Future; Expected date: 10/27/2023    5. Lipoma of back  Assessment & Plan:  2 cm x 2 cm lipoma of the upper back, right para spinal area at the base of the neck, non-tender, mobile.   Will observe for now.         Plan:       Vaccines ordered: Tdap, influenza.    - Continue with annual wellness visits by PCP      Patient Instructions   - Get vaccinated against Tetanus and Flu  - continue to do physical activity  - apply sun screen when exposed to the sun  - Drink plenty of fluids during your day and specially when exercising.       Education provided  Lifestyle recommendations given  AVS generated, explained, and sent to the patient through the patient  portal.  RTC in : 1 year for annual wellness visit, labs ordered yet          DIMTIRY GALARZA MD, MPH  Internal Medicine  [facility]  [facility]

## 2023-10-27 NOTE — PATIENT INSTRUCTIONS
- Get vaccinated against Tetanus and Flu  - continue to do physical activity  - apply sun screen when exposed to the sun  - Drink plenty of fluids during your day and specially when exercising.

## 2023-12-14 ENCOUNTER — PATIENT MESSAGE (OUTPATIENT)
Dept: INTERNAL MEDICINE | Facility: CLINIC | Age: 43
End: 2023-12-14
Payer: COMMERCIAL

## 2024-01-29 PROBLEM — Z00.00 HEALTHCARE MAINTENANCE: Status: RESOLVED | Noted: 2023-10-27 | Resolved: 2024-01-29

## 2024-01-29 PROBLEM — Z00.00 ROUTINE GENERAL MEDICAL EXAMINATION AT A HEALTH CARE FACILITY: Status: RESOLVED | Noted: 2023-10-27 | Resolved: 2024-01-29

## 2024-08-09 DIAGNOSIS — Z13.6 ENCOUNTER FOR SCREENING FOR CARDIOVASCULAR DISORDERS: Primary | ICD-10-CM

## 2024-08-23 ENCOUNTER — OFFICE VISIT (OUTPATIENT)
Dept: INTERNAL MEDICINE | Facility: CLINIC | Age: 44
End: 2024-08-23
Payer: COMMERCIAL

## 2024-08-23 ENCOUNTER — CLINICAL SUPPORT (OUTPATIENT)
Dept: INTERNAL MEDICINE | Facility: CLINIC | Age: 44
End: 2024-08-23
Payer: COMMERCIAL

## 2024-08-23 ENCOUNTER — HOSPITAL ENCOUNTER (OUTPATIENT)
Dept: RADIOLOGY | Facility: HOSPITAL | Age: 44
Discharge: HOME | End: 2024-08-23
Attending: STUDENT IN AN ORGANIZED HEALTH CARE EDUCATION/TRAINING PROGRAM
Payer: COMMERCIAL

## 2024-08-23 VITALS
BODY MASS INDEX: 28.52 KG/M2 | HEIGHT: 71 IN | WEIGHT: 203.69 LBS | SYSTOLIC BLOOD PRESSURE: 130 MMHG | HEART RATE: 66 BPM | DIASTOLIC BLOOD PRESSURE: 89 MMHG

## 2024-08-23 DIAGNOSIS — Z00.00 ROUTINE GENERAL MEDICAL EXAMINATION AT A HEALTH CARE FACILITY: Primary | ICD-10-CM

## 2024-08-23 DIAGNOSIS — Z87.81 HISTORY OF HAND FRACTURE: ICD-10-CM

## 2024-08-23 DIAGNOSIS — R17 ELEVATED BILIRUBIN: ICD-10-CM

## 2024-08-23 DIAGNOSIS — Z00.00 ANNUAL PHYSICAL EXAM: Primary | ICD-10-CM

## 2024-08-23 DIAGNOSIS — Z00.00 HEALTHCARE MAINTENANCE: ICD-10-CM

## 2024-08-23 DIAGNOSIS — D69.6 THROMBOCYTOPENIA: ICD-10-CM

## 2024-08-23 DIAGNOSIS — Z13.6 ENCOUNTER FOR SCREENING FOR CARDIOVASCULAR DISORDERS: ICD-10-CM

## 2024-08-23 DIAGNOSIS — R03.0 ELEVATED BLOOD PRESSURE READING: ICD-10-CM

## 2024-08-23 LAB
ALBUMIN SERPL BCP-MCNC: 4.2 G/DL (ref 3.5–5.2)
ALP SERPL-CCNC: 68 U/L (ref 55–135)
ALT SERPL W/O P-5'-P-CCNC: 30 U/L (ref 10–44)
ANION GAP SERPL CALC-SCNC: 4 MMOL/L (ref 8–16)
AST SERPL-CCNC: 25 U/L (ref 10–40)
BILIRUB SERPL-MCNC: 1.6 MG/DL (ref 0.1–1)
BUN SERPL-MCNC: 11 MG/DL (ref 6–20)
CALCIUM SERPL-MCNC: 9.4 MG/DL (ref 8.7–10.5)
CHLORIDE SERPL-SCNC: 108 MMOL/L (ref 95–110)
CHOLEST SERPL-MCNC: 183 MG/DL (ref 120–199)
CHOLEST/HDLC SERPL: 3.5 {RATIO} (ref 2–5)
CO2 SERPL-SCNC: 27 MMOL/L (ref 23–29)
COMPLEXED PSA SERPL-MCNC: 1.4 NG/ML (ref 0–4)
CREAT SERPL-MCNC: 1 MG/DL (ref 0.5–1.4)
ERYTHROCYTE [DISTWIDTH] IN BLOOD BY AUTOMATED COUNT: 12.8 % (ref 11.5–14.5)
EST. GFR  (NO RACE VARIABLE): >60 ML/MIN/1.73 M^2
ESTIMATED AVG GLUCOSE: 85 MG/DL (ref 68–131)
GLUCOSE SERPL-MCNC: 99 MG/DL (ref 70–110)
HBA1C MFR BLD: 4.6 % (ref 4–5.6)
HCT VFR BLD AUTO: 43 % (ref 40–54)
HCV AB SERPL QL IA: NORMAL
HDLC SERPL-MCNC: 52 MG/DL (ref 40–75)
HDLC SERPL: 28.4 % (ref 20–50)
HGB BLD-MCNC: 15.2 G/DL (ref 14–18)
LDLC SERPL CALC-MCNC: 109.6 MG/DL (ref 63–159)
MCH RBC QN AUTO: 31.4 PG (ref 27–31)
MCHC RBC AUTO-ENTMCNC: 35.3 G/DL (ref 32–36)
MCV RBC AUTO: 89 FL (ref 82–98)
NONHDLC SERPL-MCNC: 131 MG/DL
PLATELET # BLD AUTO: 138 K/UL (ref 150–450)
PMV BLD AUTO: 10.4 FL (ref 9.2–12.9)
POTASSIUM SERPL-SCNC: 4.2 MMOL/L (ref 3.5–5.1)
PROT SERPL-MCNC: 7.1 G/DL (ref 6–8.4)
RBC # BLD AUTO: 4.84 M/UL (ref 4.6–6.2)
SODIUM SERPL-SCNC: 139 MMOL/L (ref 136–145)
TRIGL SERPL-MCNC: 107 MG/DL (ref 30–150)
TSH SERPL DL<=0.005 MIU/L-ACNC: 0.69 UIU/ML (ref 0.4–4)
WBC # BLD AUTO: 4.6 K/UL (ref 3.9–12.7)

## 2024-08-23 PROCEDURE — 75571 CT HRT W/O DYE W/CA TEST: CPT | Mod: 26,,, | Performed by: STUDENT IN AN ORGANIZED HEALTH CARE EDUCATION/TRAINING PROGRAM

## 2024-08-23 PROCEDURE — 80061 LIPID PANEL: CPT | Performed by: STUDENT IN AN ORGANIZED HEALTH CARE EDUCATION/TRAINING PROGRAM

## 2024-08-23 PROCEDURE — 84443 ASSAY THYROID STIM HORMONE: CPT | Performed by: STUDENT IN AN ORGANIZED HEALTH CARE EDUCATION/TRAINING PROGRAM

## 2024-08-23 PROCEDURE — 80053 COMPREHEN METABOLIC PANEL: CPT | Performed by: STUDENT IN AN ORGANIZED HEALTH CARE EDUCATION/TRAINING PROGRAM

## 2024-08-23 PROCEDURE — 99999 PR PBB SHADOW E&M-EST. PATIENT-LVL I: CPT | Mod: PBBFAC,,,

## 2024-08-23 PROCEDURE — 36415 COLL VENOUS BLD VENIPUNCTURE: CPT | Performed by: STUDENT IN AN ORGANIZED HEALTH CARE EDUCATION/TRAINING PROGRAM

## 2024-08-23 PROCEDURE — 99999 PR PBB SHADOW E&M-EST. PATIENT-LVL III: CPT | Mod: PBBFAC,,, | Performed by: STUDENT IN AN ORGANIZED HEALTH CARE EDUCATION/TRAINING PROGRAM

## 2024-08-23 PROCEDURE — 85027 COMPLETE CBC AUTOMATED: CPT | Performed by: STUDENT IN AN ORGANIZED HEALTH CARE EDUCATION/TRAINING PROGRAM

## 2024-08-23 PROCEDURE — 83036 HEMOGLOBIN GLYCOSYLATED A1C: CPT | Performed by: STUDENT IN AN ORGANIZED HEALTH CARE EDUCATION/TRAINING PROGRAM

## 2024-08-23 PROCEDURE — 86803 HEPATITIS C AB TEST: CPT | Performed by: STUDENT IN AN ORGANIZED HEALTH CARE EDUCATION/TRAINING PROGRAM

## 2024-08-23 PROCEDURE — 75571 CT HRT W/O DYE W/CA TEST: CPT | Mod: TC

## 2024-08-23 PROCEDURE — 84153 ASSAY OF PSA TOTAL: CPT | Performed by: STUDENT IN AN ORGANIZED HEALTH CARE EDUCATION/TRAINING PROGRAM

## 2024-08-23 RX ORDER — MULTIVITAMIN
1 TABLET ORAL DAILY
COMMUNITY

## 2024-08-23 NOTE — ASSESSMENT & PLAN NOTE
Lab Results   Component Value Date    ALT 30 08/23/2024    AST 25 08/23/2024    ALKPHOS 68 08/23/2024    BILITOT 1.6 (H) 08/23/2024       Asymptomatic. At baseline  Likely Gilbert's. Will observe for now
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
## 2024-08-23 NOTE — ASSESSMENT & PLAN NOTE
Possibly stress-related.    I have recommended for patient to take blood pressure daily, keep a log, communicate with me 2 weeks with blood pressure readings.

## 2024-08-23 NOTE — ASSESSMENT & PLAN NOTE
Lab Results   Component Value Date    WBC 4.60 08/23/2024    HGB 15.2 08/23/2024    HCT 43.0 08/23/2024    MCV 89 08/23/2024     (L) 08/23/2024       Possibly idiopathic.  At baseline.    Observation for now

## 2024-08-23 NOTE — PATIENT INSTRUCTIONS
Take your blood pressure daily for the next two weeks, you may use ARM blood pressure monitor  ([employer], [employer], [employer])  Continue do to physical activity and try to stay around the current weight

## 2024-08-30 ENCOUNTER — PATIENT MESSAGE (OUTPATIENT)
Dept: INTERNAL MEDICINE | Facility: CLINIC | Age: 44
End: 2024-08-30
Payer: COMMERCIAL

## 2025-05-21 ENCOUNTER — PATIENT MESSAGE (OUTPATIENT)
Dept: INTERNAL MEDICINE | Facility: CLINIC | Age: 45
End: 2025-05-21
Payer: COMMERCIAL